# Patient Record
Sex: MALE | Race: WHITE
[De-identification: names, ages, dates, MRNs, and addresses within clinical notes are randomized per-mention and may not be internally consistent; named-entity substitution may affect disease eponyms.]

---

## 2018-01-03 ENCOUNTER — HOSPITAL ENCOUNTER (EMERGENCY)
Dept: HOSPITAL 76 - ED | Age: 28
Discharge: HOME | End: 2018-01-03
Payer: SELF-PAY

## 2018-01-03 VITALS — SYSTOLIC BLOOD PRESSURE: 161 MMHG | DIASTOLIC BLOOD PRESSURE: 105 MMHG

## 2018-01-03 DIAGNOSIS — J06.9: Primary | ICD-10-CM

## 2018-01-03 DIAGNOSIS — F17.200: ICD-10-CM

## 2018-01-03 DIAGNOSIS — B97.89: ICD-10-CM

## 2018-01-03 PROCEDURE — 99282 EMERGENCY DEPT VISIT SF MDM: CPT

## 2018-01-03 PROCEDURE — 99283 EMERGENCY DEPT VISIT LOW MDM: CPT

## 2018-01-03 NOTE — ED PHYSICIAN DOCUMENTATION
History of Present Illness





- Stated complaint


Stated Complaint: TOOTH PX





- Chief complaint


Chief Complaint: Heent





- History obtained from


History obtained from: Patient, Family





- History of Present Illness


Timing: Other (1 year ago)


Pain level max: 6


Pain level now: 0


Improved by: nothing


Worsened by: nothing





- Additonal information


Additional information: 





Patient presents to the ED complaining of tooth pain intermittently for the 

last year. None for the past few weeks. Also has a chronic cough. States 

coughing up phlegm.  No fevers. Does smoke.  Today had a coughing fit and 

became dizzy afterwards.  Now resolved. Out of his inhaler.  





Review of Systems


Constitutional: denies: Fever, Chills


Ears: denies: Ear pain


Nose: denies: Rhinorrhea / runny nose, Congestion


Throat: denies: Sore throat


Cardiac: denies: Chest pain / pressure


Respiratory: reports: Wheezing (occasionally)


GI: denies: Abdominal Pain, Nausea, Vomiting, Diarrhea


Skin: denies: Rash


Musculoskeletal: denies: Neck pain, Back pain


Neurologic: denies: Headache





PD PAST MEDICAL HISTORY





- Past Medical History


Past Medical History: Yes


Cardiovascular: None


Respiratory: Asthma


Neuro: None


Endocrine/Autoimmune: None


GI: None


: None


HEENT: None


Psych: Depression, Anxiety


Musculoskeletal: None


Derm: None





- Past Surgical History


Past Surgical History: Yes


General: Other





- Present Medications


Home Medications: 


 Ambulatory Orders











 Medication  Instructions  Recorded  Confirmed


 


Albuterol Sulfate [Ventolin Hfa] 2 puffs IH Q4HR PRN #1 hfa.aer.ad 06/26/16 07/ 05/16


 


ALPRAZolam [Xanax]  08/15/16 


 


Omeprazole [PriLOSEC]  08/15/16 


 


Albuterol Sulf [Ventolin Hfa 2 puffs INH Q4HR PRN #1 inhaler 01/03/18 





Inhaler]   


 


Benzonatate [Tessalon Perle] 100 - 200 mg PO TID PRN #30 capsule 01/03/18 














- Allergies


Allergies/Adverse Reactions: 


 Allergies











Allergy/AdvReac Type Severity Reaction Status Date / Time


 


No Known Drug Allergies Allergy   Verified 01/03/18 18:14














- Social History


Does the pt smoke?: Yes


Smoking Status: Current every day smoker


Does the pt drink ETOH?: No


Does the pt have substance abuse?: No





- Immunizations


Immunizations are current?: Yes





- POLST


Patient has POLST: No





PD ED PE NORMAL





- Vitals


Vital signs reviewed: Yes





- General


General: Alert and oriented X 3, No acute distress, Well developed/nourished





- HEENT


HEENT: PERRL, Ears normal, Moist mucous membranes, Pharynx benign





- Neck


Neck: Supple, no meningeal sign





- Cardiac


Cardiac: RRR, Strong equal pulses





- Respiratory


Respiratory: No respiratory distress, Clear bilaterally





- Abdomen


Abdomen: Soft, Non tender, Non distended





- Derm


Derm: Warm and dry





- Neuro


Neuro: Alert and oriented X 3





- Psych


Psych: Normal mood, Normal affect





Results





- Vitals


Vitals: 


 Vital Signs - 24 hr











  01/03/18





  18:12


 


Temperature 36.6 C


 


Heart Rate 84


 


Respiratory 18





Rate 


 


Blood Pressure 161/105 H


 


O2 Saturation 97








 Oxygen











O2 Source                      Room air

















PD MEDICAL DECISION MAKING





- ED course


Complexity details: considered differential, d/w patient, d/w family


ED course: 





Patient is a 27-year-old male who presents to the emergency department with 

multiple nonspecific complaints, but nothing that seems that he wants addressed 

urgently.  The main concern seems to be that he got dizzy after coughing today.

  And he would like his inhaler refilled.  Will refill this for him.  No 

fevers.  No hypoxia.  No pneumonia.  No sepsis.  Patient counseled regarding 

signs and symptoms for which I believe and urgent re-evaluation would be 

necessary. Patient with good understanding of and agreement to plan and is 

comfortable going home at this time





This document was made in part using voice recognition software. While efforts 

are made to proofread this document, sound alike and grammatical errors may 

occur.





Departure





- Departure


Disposition: 01 Home, Self Care


Clinical Impression: 


 Viral URI





Condition: Good


Instructions:  ED Viral Syndrome


Follow-Up: 


your,doctor in 1 week [Other]


Prescriptions: 


Albuterol Sulf [Ventolin Hfa Inhaler] 2 puffs INH Q4HR PRN #1 inhaler


 PRN Reason: Wheezing


Benzonatate [Tessalon Perle] 100 - 200 mg PO TID PRN #30 capsule


 PRN Reason: Cough


Comments: 


Return if you worsen. 


Discharge Date/Time: 01/03/18 18:44

## 2018-08-23 ENCOUNTER — HOSPITAL ENCOUNTER (EMERGENCY)
Dept: HOSPITAL 76 - ED | Age: 28
Discharge: HOME | End: 2018-08-23
Payer: COMMERCIAL

## 2018-08-23 ENCOUNTER — HOSPITAL ENCOUNTER (OUTPATIENT)
Dept: HOSPITAL 76 - EMS | Age: 28
Discharge: ANOTHER HOSPITAL | End: 2018-08-23
Attending: SURGERY
Payer: MEDICAID

## 2018-08-23 VITALS — SYSTOLIC BLOOD PRESSURE: 139 MMHG | DIASTOLIC BLOOD PRESSURE: 88 MMHG

## 2018-08-23 DIAGNOSIS — F41.9: ICD-10-CM

## 2018-08-23 DIAGNOSIS — F17.200: ICD-10-CM

## 2018-08-23 DIAGNOSIS — F41.0: Primary | ICD-10-CM

## 2018-08-23 DIAGNOSIS — R46.89: Primary | ICD-10-CM

## 2018-08-23 PROCEDURE — 99282 EMERGENCY DEPT VISIT SF MDM: CPT

## 2018-08-23 NOTE — ED PHYSICIAN DOCUMENTATION
History of Present Illness





- Stated complaint


Stated Complaint: ANXIETY





- History obtained from


History obtained from: Patient, EMS, Police





- History of Present Illness


Timing: Today





- Additonal information


Additional information: 





Patient is a 28 year old male with a history of anxiety who is presenting to 

the emergency department for a panic attack.  Patient was being arrested and 

then a had a panic attack and was "passing out" so he was brought in for 

evaluation.  Upon initial evaluation in the emergency department patient was 

awake, alert and in no distress.  





Review of Systems


Ten Systems: 10 systems reviewed and negative


Psychiatric: reports: Anxiety





PD PAST MEDICAL HISTORY





- Past Medical History


Cardiovascular: None


Respiratory: Asthma


Endocrine/Autoimmune: None


GI: None


: None


HEENT: None


Psych: Depression, Anxiety


Musculoskeletal: None


Derm: None





- Past Surgical History


Past Surgical History: Yes


General: Other





- Present Medications


Home Medications: 


 Ambulatory Orders











 Medication  Instructions  Recorded  Confirmed


 


Albuterol Sulfate [Ventolin Hfa] 2 puffs IH Q4HR PRN #1 hfa.aer.ad 06/26/16 07/ 05/16


 


ALPRAZolam [Xanax]  08/15/16 


 


Omeprazole [PriLOSEC]  08/15/16 


 


Albuterol Sulf [Ventolin Hfa 2 puffs INH Q4HR PRN #1 inhaler 01/03/18 





Inhaler]   


 


Benzonatate [Tessalon Perle] 100 - 200 mg PO TID PRN #30 capsule 01/03/18 














- Allergies


Allergies/Adverse Reactions: 


 Allergies











Allergy/AdvReac Type Severity Reaction Status Date / Time


 


No Known Drug Allergies Allergy   Verified 08/23/18 02:10














- Social History


Does the pt smoke?: Yes


Smoking Status: Current every day smoker


Does the pt drink ETOH?: No


Does the pt have substance abuse?: No





- Immunizations


Immunizations are current?: Yes





- POLST


Patient has POLST: No





PD ED PE NORMAL





- Vitals


Vital signs reviewed: Yes





- General


General: Alert and oriented X 3, No acute distress





- HEENT


HEENT: Atraumatic





- Cardiac


Cardiac: RRR





- Respiratory


Respiratory: No respiratory distress





- Abdomen


Abdomen: Non distended





- Derm


Derm: Normal color, Warm and dry





- Extremities


Extremities: No deformity





- Neuro


Neuro: Alert and oriented X 3, No motor deficit, Normal speech


Eye Opening: Spontaneous


Motor: Obeys Commands


Verbal: Oriented


GCS Score: 15





Results





- Vitals


Vitals: 


 Vital Signs - 24 hr











  08/23/18





  02:00


 


Temperature 36.9 C


 


Heart Rate 99


 


Respiratory 18





Rate 


 


Blood Pressure 139/88 H


 


O2 Saturation 100








 Oxygen











O2 Source                      Room air

















PD MEDICAL DECISION MAKING





- ED course


Complexity details: reviewed old records, re-evaluated patient, considered 

differential, d/w patient


ED course: 





Patient was seen and examined at bedside.  Patient was well appearing and in no 

distress.  Vital signs were within normal limits.  Patient was asymptomatic and 

was stable for discharge with outpatient followup.  





- Sepsis Event


Vital Signs: 


 Vital Signs - 24 hr











  08/23/18





  02:00


 


Temperature 36.9 C


 


Heart Rate 99


 


Respiratory 18





Rate 


 


Blood Pressure 139/88 H


 


O2 Saturation 100








 Oxygen











O2 Source                      Room air

















Departure





- Departure


Disposition: 01 Home, Self Care


Clinical Impression: 


 Panic attack, Anxiety





Condition: Good


Instructions:  ED Stress React, ED Panic Attack


Follow-Up: 


primary,care provider [Other] - As Needed


Comments: 


You may return for worsening symptoms.

## 2020-08-23 ENCOUNTER — HOSPITAL ENCOUNTER (OUTPATIENT)
Dept: HOSPITAL 76 - EMS | Age: 30
End: 2020-08-23
Attending: SURGERY
Payer: SELF-PAY

## 2020-08-23 ENCOUNTER — HOSPITAL ENCOUNTER (EMERGENCY)
Dept: HOSPITAL 76 - ED | Age: 30
Discharge: HOME | End: 2020-08-23
Payer: MEDICAID

## 2020-08-23 VITALS — SYSTOLIC BLOOD PRESSURE: 170 MMHG | DIASTOLIC BLOOD PRESSURE: 101 MMHG

## 2020-08-23 DIAGNOSIS — F17.200: ICD-10-CM

## 2020-08-23 DIAGNOSIS — M54.5: Primary | ICD-10-CM

## 2020-08-23 DIAGNOSIS — J45.909: ICD-10-CM

## 2020-08-23 DIAGNOSIS — G89.29: ICD-10-CM

## 2020-08-23 DIAGNOSIS — R11.10: ICD-10-CM

## 2020-08-23 DIAGNOSIS — R10.9: Primary | ICD-10-CM

## 2020-08-23 DIAGNOSIS — R03.0: ICD-10-CM

## 2020-08-23 LAB
CLARITY UR REFRACT.AUTO: CLEAR
GLUCOSE UR QL STRIP.AUTO: NEGATIVE MG/DL
KETONES UR QL STRIP.AUTO: NEGATIVE MG/DL
NITRITE UR QL STRIP.AUTO: NEGATIVE
PH UR STRIP.AUTO: 6 PH (ref 5–7.5)
PROT UR STRIP.AUTO-MCNC: NEGATIVE MG/DL
RBC # UR STRIP.AUTO: NEGATIVE /UL
SP GR UR STRIP.AUTO: 1.02 (ref 1–1.03)
UROBILINOGEN UR QL STRIP.AUTO: (no result) E.U./DL
UROBILINOGEN UR STRIP.AUTO-MCNC: NEGATIVE MG/DL

## 2020-08-23 PROCEDURE — 87086 URINE CULTURE/COLONY COUNT: CPT

## 2020-08-23 PROCEDURE — 81001 URINALYSIS AUTO W/SCOPE: CPT

## 2020-08-23 PROCEDURE — 99283 EMERGENCY DEPT VISIT LOW MDM: CPT

## 2020-08-23 PROCEDURE — 81003 URINALYSIS AUTO W/O SCOPE: CPT

## 2020-08-23 NOTE — ED PHYSICIAN DOCUMENTATION
PD HPI BACK PAIN





- Stated complaint


Stated Complaint: LOWER BACK PX





- Chief complaint


Chief Complaint: Back Pain





- History obtained from


History obtained from: Patient





- Additional information


Additional information: 





30-year-old gentleman with chronic lower back pain.  He is now been incarcerated

for a few days.  He states that previous problems have been from his ribs and 

from his low back and he thinks his kidneys.  Current pain is in the upper 

lumbar spine and just to either side.  Is worse with bending and twisting.  

Otherwise it does not radiate.  There is no weakness, numbness, or tingling in 

the legs.  No saddle anesthesia or incontinence.  No history of drug use IV.  No

fevers.  He does feel like urination is "hot."





Review of Systems


Constitutional: denies: Fever, Chills


Cardiac: denies: Chest pain / pressure, Palpitations


Respiratory: denies: Dyspnea, Cough


GI: reports: Reviewed and negative.  denies: Abdominal Pain, Nausea, Vomiting





PD PAST MEDICAL HISTORY





- Past Medical History


Cardiovascular: None


Respiratory: Asthma


Endocrine/Autoimmune: None


GI: None


: None


HEENT: None


Psych: Depression, Anxiety


Musculoskeletal: None


Derm: None





- Past Surgical History


Past Surgical History: Yes


General: Other





- Present Medications


Home Medications: 


                                Ambulatory Orders











 Medication  Instructions  Recorded  Confirmed


 


Albuterol Sulfate [Ventolin Hfa] 2 puffs IH Q4HR PRN #1 hfa.aer.ad 06/26/16 07/05/16


 


ALPRAZolam [Xanax]  08/15/16 


 


Omeprazole [PriLOSEC]  08/15/16 


 


Albuterol Sulf [Ventolin Hfa 2 puffs INH Q4HR PRN #1 inhaler 01/03/18 





Inhaler]   


 


Benzonatate [Tessalon Perle] 100 - 200 mg PO TID PRN #30 capsule 01/03/18 


 


Naproxen 500 mg PO BID PRN #30 tablet 08/23/20 














- Allergies


Allergies/Adverse Reactions: 


                                    Allergies











Allergy/AdvReac Type Severity Reaction Status Date / Time


 


No Known Drug Allergies Allergy   Verified 08/23/20 20:04














- Social History


Does the pt smoke?: Yes


Smoking Status: Current every day smoker


Does the pt drink ETOH?: No


Does the pt have substance abuse?: No





- Immunizations


Immunizations are current?: Yes





- POLST


Patient has POLST: No





PD ED PE NORMAL





- Vitals


Vital signs reviewed: Yes





- General


General: Alert and oriented X 3, No acute distress





- Back


Back: Other (Mild tenderness of the upper lumbar spine and right greater than 

left CVA areas.  No rib tenderness.)





- Extremities


Extremities: No edema, No calf tenderness / cord, Other (The patient has equal 

and normal Achilles and patellar reflexes bilaterally.  Normal sensation in all 

areas of the legs.  Patient denies saddle anesthesia.  Normal strength in 

flexion-extension at the ankles, knees, and flexion of the hips.)





- Neuro


Neuro: Alert and oriented X 3, Normal speech





Results





- Vitals


Vitals: 


                               Vital Signs - 24 hr











  08/23/20





  20:02


 


Temperature 36.7 C


 


Heart Rate 81


 


Respiratory 14





Rate 


 


Blood Pressure 170/101 H


 


O2 Saturation 98








                                     Oxygen











O2 Source                      Room air

















- Labs


Labs: 


                                Laboratory Tests











  08/23/20





  20:14


 


Urine Color  LT. YELLOW


 


Urine Clarity  CLEAR


 


Urine pH  6.0


 


Ur Specific Gravity  1.025


 


Urine Protein  NEGATIVE


 


Urine Glucose (UA)  NEGATIVE


 


Urine Ketones  NEGATIVE


 


Urine Occult Blood  NEGATIVE


 


Urine Nitrite  NEGATIVE


 


Urine Bilirubin  NEGATIVE


 


Urine Urobilinogen  0.2 (NORMAL)


 


Ur Leukocyte Esterase  NEGATIVE


 


Ur Microscopic Review  NOT INDICATED


 


Urine Culture Comments  NOT INDICATED














PD MEDICAL DECISION MAKING





- ED course


ED course: 





This patient has seemingly uncomplicated musculoskeletal back pain.  The patient

has no "red flags."  Specifically denies IV drug use, fevers, incontinence, sa

ddle anesthesia.  Spinal epidural abscess was considered, given that the patient

has no fever, is not diabetic, has no spinal tenderness, does not use IV drugs, 

and has no bilateral neurologic symptoms, the diagnosis of spinal epidural 

abscess is considered exceedingly unlikely.





Departure





- Departure


Disposition: 01 Home, Self Care


Clinical Impression: 


Back pain


Qualifiers:


 Back pain location: low back pain Chronicity: chronic Back pain laterality: 

bilateral Sciatica presence: without sciatica Qualified Code(s): M54.5 - Low 

back pain; G89.29 - Other chronic pain





Condition: Good


Record reviewed to determine appropriate education?: Yes


Instructions:  ED Chronic Pain Management, ED Neck Back Pain General


Prescriptions: 


Naproxen 500 mg PO BID PRN #30 tablet


 PRN Reason: Pain


Comments: 


Urinalysis is normal.  Follow-up with primary care soon as possible.  Return for

new or worsening symptoms.





Your blood pressure was elevated today on check into the emergency department.  

This does not mean that you have hypertension, it is a common phenomenon to come

to the emergency department and have elevated blood pressure.  I recommend that 

you see your primary care physician within the week to have it rechecked when 

you are feeling better.